# Patient Record
Sex: MALE | Race: WHITE | NOT HISPANIC OR LATINO | Employment: FULL TIME | URBAN - METROPOLITAN AREA
[De-identification: names, ages, dates, MRNs, and addresses within clinical notes are randomized per-mention and may not be internally consistent; named-entity substitution may affect disease eponyms.]

---

## 2018-06-07 ENCOUNTER — OFFICE VISIT (OUTPATIENT)
Dept: MEDICAL GROUP | Facility: PHYSICIAN GROUP | Age: 23
End: 2018-06-07
Payer: COMMERCIAL

## 2018-06-07 VITALS
OXYGEN SATURATION: 98 % | HEIGHT: 69 IN | DIASTOLIC BLOOD PRESSURE: 74 MMHG | TEMPERATURE: 98.4 F | WEIGHT: 213 LBS | RESPIRATION RATE: 16 BRPM | HEART RATE: 89 BPM | BODY MASS INDEX: 31.55 KG/M2 | SYSTOLIC BLOOD PRESSURE: 132 MMHG

## 2018-06-07 DIAGNOSIS — Z23 NEED FOR VACCINATION: ICD-10-CM

## 2018-06-07 PROBLEM — Z76.89 ENCOUNTER TO ESTABLISH CARE WITH NEW DOCTOR: Status: ACTIVE | Noted: 2018-06-07

## 2018-06-07 PROCEDURE — 99203 OFFICE O/P NEW LOW 30 MIN: CPT | Mod: 25 | Performed by: NURSE PRACTITIONER

## 2018-06-07 PROCEDURE — 90715 TDAP VACCINE 7 YRS/> IM: CPT | Performed by: NURSE PRACTITIONER

## 2018-06-07 PROCEDURE — 90471 IMMUNIZATION ADMIN: CPT | Performed by: NURSE PRACTITIONER

## 2018-06-07 ASSESSMENT — PATIENT HEALTH QUESTIONNAIRE - PHQ9: CLINICAL INTERPRETATION OF PHQ2 SCORE: 0

## 2018-06-07 NOTE — PROGRESS NOTES
Chief Complaint   Patient presents with   • Immunizations     tdap       HISTORY OF PRESENT ILLNESS: Patient is a 23 y.o. male  patient who is here to reestablish care with me in this office.  He was a patient of my previous office.  He is here today to discuss the following issues:    Encounter to establish care with new doctor  Is here to reestablish care with me.    Need for vaccination  Due for Tdap.  [employer] has put a hold on his classes until he gets it done.        Patient Active Problem List    Diagnosis Date Noted   • Encounter to establish care with new doctor 06/07/2018   • Need for vaccination 06/07/2018       Allergies:Patient has no known allergies.    No current outpatient prescriptions on file.     No current facility-administered medications for this visit.        Social History   Substance Use Topics   • Smoking status: Never Smoker   • Smokeless tobacco: Current User     Types: Chew   • Alcohol use No       No family status information on file.   History reviewed. No pertinent family history.    Review of Systems:   Constitutional: Negative for fever, chills, weight loss and malaise/fatigue.   HENT: Negative for ear pain, nosebleeds, congestion, sore throat and neck pain.    Eyes: Negative for blurred vision.   Respiratory: Negative for cough, sputum production, shortness of breath and wheezing.    Cardiovascular: Negative for chest pain, palpitations, orthopnea and leg swelling.   Gastrointestinal: Negative for heartburn, nausea, vomiting and abdominal pain.   Genitourinary: Negative for dysuria, urgency and frequency.   Musculoskeletal: Negative for myalgias, joint pain, and back pain.  Skin: Negative for rash and itching.   Neurological: Negative for dizziness, tingling, tremors, sensory change, focal weakness and headaches.   Endo/Heme/Allergies: Does not bruise/bleed easily.   Psychiatric/Behavioral: Negative for depression, suicidal ideas and memory loss.  The patient is not nervous/anxious  "and does not have insomnia.    All other systems reviewed and are negative except as in HPI.    Exam:  Blood pressure 132/74, pulse 89, temperature 36.9 °C (98.4 °F), resp. rate 16, height 1.753 m (5' 9\"), weight 96.6 kg (213 lb), SpO2 98 %.  General:  Well nourished, well developed male in NAD  Head: Grossly normal.  Neck: Supple without JVD or bruit. Thyroid is not enlarged.  Pulmonary: Clear to ausculation. Normal effort. No rales, ronchi, or wheezing.  Cardiovascular: Regular rate and rhythm without murmur.   Extremities: No clubbing, cyanosis, or edema.  Skin: Intact with no obvious rashes or lesions.  Neuro: Grossly intact.  Psych: Alert and oriented x 3.  Mood and affect appropriate.    Medical decision-making and discussion: Koko is here to reestablish care with me.  We reviewed his past medical history and discussed his current medications. He was given a Tdap shot today.  He will sign a records release for my previous office, he will sign up with [facility], and he will plan to follow-up here as needed.    I have placed the below orders and discussed them with an approved delegating provider. The MA is performing the below orders under the direction of Dr. Rosado, who have provided verbal consent for supervision.        Assessment/Plan:  1. Need for vaccination  TDAP VACCINE =>8YO IM   2. BMI 31.0-31.9,adult  Patient identified as having weight management issue.  Appropriate orders and counseling given.       Return if symptoms worsen or fail to improve.    Please note that this dictation was created using voice recognition software. I have made every reasonable attempt to correct obvious errors, but I expect that there are errors of grammar and possibly content that I did not discover before finalizing the note.      "

## 2022-06-07 ENCOUNTER — OFFICE VISIT (OUTPATIENT)
Dept: URGENT CARE | Facility: CLINIC | Age: 27
End: 2022-06-07

## 2022-06-07 VITALS
OXYGEN SATURATION: 98 % | HEIGHT: 69 IN | BODY MASS INDEX: 32.58 KG/M2 | RESPIRATION RATE: 18 BRPM | SYSTOLIC BLOOD PRESSURE: 126 MMHG | DIASTOLIC BLOOD PRESSURE: 74 MMHG | HEART RATE: 80 BPM | WEIGHT: 220 LBS | TEMPERATURE: 98.7 F

## 2022-06-07 DIAGNOSIS — U07.1 COVID-19: ICD-10-CM

## 2022-06-07 DIAGNOSIS — J06.9 URI WITH COUGH AND CONGESTION: ICD-10-CM

## 2022-06-07 LAB
EXTERNAL QUALITY CONTROL: ABNORMAL
SARS-COV+SARS-COV-2 AG RESP QL IA.RAPID: POSITIVE

## 2022-06-07 PROCEDURE — 87426 SARSCOV CORONAVIRUS AG IA: CPT | Performed by: PHYSICIAN ASSISTANT

## 2022-06-07 PROCEDURE — 99203 OFFICE O/P NEW LOW 30 MIN: CPT | Mod: CS | Performed by: PHYSICIAN ASSISTANT

## 2022-06-07 ASSESSMENT — ENCOUNTER SYMPTOMS
EYE DISCHARGE: 0
COUGH: 1
EYE REDNESS: 0
MYALGIAS: 1
SORE THROAT: 0
DIARRHEA: 0
CHILLS: 1
FEVER: 0
WHEEZING: 0
VOMITING: 0
NAUSEA: 1
HEADACHES: 1
SHORTNESS OF BREATH: 1

## 2022-06-08 NOTE — PROGRESS NOTES
Subjective     Koko Paz is a 27 y.o. male who presents with Cold Symptoms (X6 days, Chills, chest congested), Nausea (X2 days ), and Cough            Cough  This is a new problem. Episode onset: x 5 days ago. The problem has been unchanged. The problem occurs constantly. The cough is non-productive. Associated symptoms include chills, ear congestion, headaches, myalgias, nasal congestion and shortness of breath (The patient reports intermittent shortness of breath.). Pertinent negatives include no chest pain, ear pain, eye redness, fever, rash, sore throat or wheezing. He has tried OTC cough suppressant (The patient states he has been trying multiple OTC cough/cold medications.) for the symptoms.     The patient states a coworker was recently sick with cold-like symptoms.  He reports known exposure to COVID-19.  No known exposure to influenza.  The patient is fully vaccinated against COVID-19.    PMH:  has no past medical history on file.  MEDS: No current outpatient medications on file.  ALLERGIES: No Known Allergies  SURGHX:   Past Surgical History:   Procedure Laterality Date   • MYRINGOTOMY     • TONSILLECTOMY AND ADENOIDECTOMY       SOCHX:  reports that he has never smoked. His smokeless tobacco use includes chew. He reports current alcohol use. He reports that he does not use drugs.  FH: Family history was reviewed, no pertinent findings to report        Review of Systems   Constitutional: Positive for chills. Negative for fever.   HENT: Positive for congestion. Negative for ear pain and sore throat.    Eyes: Negative for discharge and redness.   Respiratory: Positive for cough and shortness of breath (The patient reports intermittent shortness of breath.). Negative for wheezing.    Cardiovascular: Negative for chest pain and leg swelling.   Gastrointestinal: Positive for nausea (The patient reports slight nausea.). Negative for diarrhea and vomiting.   Musculoskeletal: Positive for myalgias.   Skin:  "Negative for rash.   Neurological: Positive for headaches.              Objective     /74   Pulse 80   Temp 37.1 °C (98.7 °F) (Temporal)   Resp 18   Ht 1.753 m (5' 9\")   Wt 99.8 kg (220 lb)   SpO2 98%   BMI 32.49 kg/m²      Physical Exam  Constitutional:       General: He is not in acute distress.     Appearance: Normal appearance. He is not ill-appearing.   HENT:      Head: Normocephalic and atraumatic.      Right Ear: Tympanic membrane, ear canal and external ear normal.      Left Ear: Tympanic membrane, ear canal and external ear normal.      Nose: Nose normal.      Mouth/Throat:      Mouth: Mucous membranes are moist.      Pharynx: Oropharynx is clear. No posterior oropharyngeal erythema.   Eyes:      Extraocular Movements: Extraocular movements intact.      Conjunctiva/sclera: Conjunctivae normal.   Cardiovascular:      Rate and Rhythm: Normal rate and regular rhythm.      Heart sounds: Normal heart sounds.   Pulmonary:      Effort: Pulmonary effort is normal. No respiratory distress.      Breath sounds: Normal breath sounds. No wheezing.   Musculoskeletal:         General: Normal range of motion.      Cervical back: Normal range of motion and neck supple.   Skin:     General: Skin is warm and dry.   Neurological:      Mental Status: He is alert and oriented to person, place, and time.               Progress:  POCT Rapid COVID-19: POSITIVE                 Assessment & Plan         1. URI with cough and congestion  - POCT SARS-COV Antigen LLOYD (Symptomatic only)    2. COVID-19    The patient's presenting symptoms and physical exam findings are consistent with a URI with associated cough and congestion.  The patient's physical exam today in clinic was normal.  The patient's lungs were clear to auscultation without wheezing, and his pulse ox was within normal limits.  The patient is nontoxic and appears in no acute distress.  The patient's vital signs are stable and within normal limits.  He is " afebrile today in clinic.  Discussed likely viral etiology with the patient.  The patient's POCT rapid COVID-19 testing today in clinic was positive.  This is consistent with the patient's current symptoms.  Advised patient to stay at home under self quarantine per CDC guidelines.  Recommend OTC medications and supportive care for symptomatic management.  Informed the patient that taking multiple OTC cough/cold medications may be contributing to his associated nausea.  Recommend the patient follow-up with his PCP as needed.  Discussed return precautions with the patient, and he verbalized understanding.    Differential diagnoses, supportive care, and indications for immediate follow-up discussed with patient.   Instructed to return to clinic or nearest emergency department for any change in condition, further concerns, or worsening of symptoms.    OTC Tylenol or Motrin for fever/discomfort.  OTC cough/cold medication for symptomatic relief  OTC Supportive Care for Congestion - saline nasal spray or neti pot  Drink plenty of fluids  Follow-up with PCP  Return to clinic or go to the ED if symptoms worsen or fail to improve, or if the patient should develop worsening/increasing cough, congestion, ear pain, sore throat, shortness of breath, wheezing, chest pain, fever/chills, and/or any concerning symptoms.    Discussed plan with the patient, and he agrees to the above.    I personally reviewed prior external notes and test results pertinent to today's visit.  I have independently reviewed and interpreted all diagnostics ordered during this urgent care visit.       Please note that this dictation was created using voice recognition software. I have made every reasonable attempt to correct obvious errors, but I expect that there may be errors of grammar and possibly content that I did not discover before finalizing the note.     This note was electronically signed by Cleo Hood PA-C

## 2025-01-21 ENCOUNTER — OFFICE VISIT (OUTPATIENT)
Dept: URGENT CARE | Facility: CLINIC | Age: 30
End: 2025-01-21
Payer: COMMERCIAL

## 2025-01-21 VITALS
HEART RATE: 63 BPM | RESPIRATION RATE: 16 BRPM | OXYGEN SATURATION: 98 % | DIASTOLIC BLOOD PRESSURE: 76 MMHG | HEIGHT: 69 IN | SYSTOLIC BLOOD PRESSURE: 124 MMHG | BODY MASS INDEX: 30.07 KG/M2 | TEMPERATURE: 97 F | WEIGHT: 203 LBS

## 2025-01-21 DIAGNOSIS — R42 VERTIGO: ICD-10-CM

## 2025-01-21 PROCEDURE — 3078F DIAST BP <80 MM HG: CPT | Performed by: PHYSICIAN ASSISTANT

## 2025-01-21 PROCEDURE — 99213 OFFICE O/P EST LOW 20 MIN: CPT | Performed by: PHYSICIAN ASSISTANT

## 2025-01-21 PROCEDURE — 3074F SYST BP LT 130 MM HG: CPT | Performed by: PHYSICIAN ASSISTANT

## 2025-01-21 RX ORDER — MECLIZINE HYDROCHLORIDE 25 MG/1
25 TABLET ORAL 3 TIMES DAILY PRN
Qty: 30 TABLET | Refills: 0 | Status: SHIPPED | OUTPATIENT
Start: 2025-01-21

## 2025-01-21 ASSESSMENT — ENCOUNTER SYMPTOMS
VOMITING: 0
NECK PAIN: 0
HEADACHES: 0
WEAKNESS: 0
NAUSEA: 0
DIZZINESS: 1
BLURRED VISION: 0
TINGLING: 0

## 2025-01-21 NOTE — PROGRESS NOTES
"  Subjective:     Koko Paz  is a 29 y.o. male who presents for Dizziness (X 2 days, patient explains was at home and did notice dizziness when turning head )       He presents today with dizziness ongoing over the last 2 days.  States that the dizziness is frequent and does appear to worsen with head movements.  He describes the world spinning around him.  No head trauma or injury associate with symptom onset.  No recent drug or alcohol use.  He denies nausea with symptoms.  No change in hearing or hearing loss with the dizziness.  No numbness/ting or weakness of the face or extremities.  Has not use any medications.  No recent respiratory illnesses.       Review of Systems   HENT:  Negative for hearing loss.    Eyes:  Negative for blurred vision.   Gastrointestinal:  Negative for nausea and vomiting.   Musculoskeletal:  Negative for neck pain.   Neurological:  Positive for dizziness. Negative for tingling, weakness and headaches.      No Known Allergies  History reviewed. No pertinent past medical history.     Objective:   /76 (BP Location: Left arm, Patient Position: Sitting, BP Cuff Size: Large adult)   Pulse 63   Temp 36.1 °C (97 °F)   Resp 16   Ht 1.753 m (5' 9\")   Wt 92.1 kg (203 lb)   SpO2 98%   BMI 29.98 kg/m²   Physical Exam  Vitals and nursing note reviewed.   Constitutional:       General: He is not in acute distress.     Appearance: Normal appearance. He is not ill-appearing, toxic-appearing or diaphoretic.   HENT:      Head: Normocephalic.      Right Ear: Tympanic membrane, ear canal and external ear normal. There is no impacted cerumen.      Left Ear: Tympanic membrane, ear canal and external ear normal. There is no impacted cerumen.      Nose: No congestion or rhinorrhea.      Mouth/Throat:      Mouth: Mucous membranes are moist.      Pharynx: No oropharyngeal exudate or posterior oropharyngeal erythema.   Eyes:      General:         Right eye: No discharge.         Left eye: No " discharge.      Extraocular Movements: Extraocular movements intact.      Conjunctiva/sclera: Conjunctivae normal.      Pupils: Pupils are equal, round, and reactive to light.      Comments: Horizontal nystagmus when looking towards the right.  No bidirectional nystagmus.  Normal test of skew   Cardiovascular:      Rate and Rhythm: Normal rate and regular rhythm.   Pulmonary:      Effort: Pulmonary effort is normal. No respiratory distress.      Breath sounds: Normal breath sounds. No stridor. No wheezing or rhonchi.   Musculoskeletal:      Cervical back: Neck supple.   Lymphadenopathy:      Cervical: No cervical adenopathy.   Neurological:      General: No focal deficit present.      Mental Status: He is alert and oriented to person, place, and time.      Cranial Nerves: No cranial nerve deficit.      Sensory: No sensory deficit.   Psychiatric:         Mood and Affect: Mood normal.         Behavior: Behavior normal.         Thought Content: Thought content normal.         Judgment: Judgment normal.             Diagnostic testing: None    Assessment/Plan:     Encounter Diagnoses   Name Primary?    Vertigo          Plan for care for today's complaint includes this patient presents with dizziness, most consistent with a peripheral cause, likely vertigo.  No red flag features for central vertigo to include gradual onset, vertical/bidirectional or nonfatigable nystagmus, no focal neurologic findings on exam. Presentation not consistent with an acute CNS infection, vertebral basilar artery insufficiency, cerebellar hemorrhage or infarction, intracranial mass or bleed, trauma, or complex migraine headache. Other acute, emergent causes of vertigo are unlikely given at this time. No indication for emergent head imaging at this time. Also unlikely low blood glucose or cardiac related. Unidirectional horizontal nystagmus present which furthers my suspicion for BPPV.  Start patient on meclizine.  Vital signs were stable during  today's office visit, patient was overall well-appearing. Continue to monitor symptoms and return to urgent care or follow-up with primary care provider if symptoms remain ongoing.  Follow-up in the emergency department if symptoms become severe, ER precautions discussed in office today.  Prescription for meclizine provided.    See AVS Instructions below for written guidance provided to patient on after-visit management and care in addition to our verbal discussion during the visit.    Please note that this dictation was created using voice recognition software. I have attempted to correct all errors, but there may be sound-alike, spelling, grammar and possibly content errors that I did not discover before finalizing the note.    [place] Vesna PERALES

## 2025-04-28 ENCOUNTER — APPOINTMENT (OUTPATIENT)
Dept: INTERNAL MEDICINE | Facility: OTHER | Age: 30
End: 2025-04-28
Payer: COMMERCIAL